# Patient Record
Sex: FEMALE | Race: WHITE | URBAN - METROPOLITAN AREA
[De-identification: names, ages, dates, MRNs, and addresses within clinical notes are randomized per-mention and may not be internally consistent; named-entity substitution may affect disease eponyms.]

---

## 2017-01-01 ENCOUNTER — HOME CARE/HOSPICE - HEALTHEAST (OUTPATIENT)
Dept: HOME HEALTH SERVICES | Facility: HOME HEALTH | Age: 77
End: 2017-01-01

## 2017-01-01 ENCOUNTER — MEDICAL CORRESPONDENCE (OUTPATIENT)
Dept: HEALTH INFORMATION MANAGEMENT | Facility: CLINIC | Age: 77
End: 2017-01-01

## 2017-01-01 ENCOUNTER — OFFICE VISIT - HEALTHEAST (OUTPATIENT)
Dept: GERIATRICS | Facility: CLINIC | Age: 77
End: 2017-01-01

## 2017-01-01 ENCOUNTER — TELEPHONE (OUTPATIENT)
Dept: FAMILY MEDICINE | Facility: CLINIC | Age: 77
End: 2017-01-01

## 2017-01-01 ENCOUNTER — SURGERY - HEALTHEAST (OUTPATIENT)
Dept: GASTROENTEROLOGY | Facility: HOSPITAL | Age: 77
End: 2017-01-01

## 2017-01-01 ENCOUNTER — AMBULATORY - HEALTHEAST (OUTPATIENT)
Dept: ADMINISTRATIVE | Facility: REHABILITATION | Age: 77
End: 2017-01-01

## 2017-01-01 ENCOUNTER — OFFICE VISIT (OUTPATIENT)
Dept: FAMILY MEDICINE | Facility: CLINIC | Age: 77
End: 2017-01-01

## 2017-01-01 VITALS
OXYGEN SATURATION: 97 % | DIASTOLIC BLOOD PRESSURE: 71 MMHG | HEART RATE: 68 BPM | RESPIRATION RATE: 16 BRPM | TEMPERATURE: 97.8 F | SYSTOLIC BLOOD PRESSURE: 125 MMHG

## 2017-01-01 DIAGNOSIS — K92.2 UPPER GI BLEEDING: ICD-10-CM

## 2017-01-01 DIAGNOSIS — R63.0 POOR APPETITE: ICD-10-CM

## 2017-01-01 DIAGNOSIS — I10 HYPERTENSION: ICD-10-CM

## 2017-01-01 DIAGNOSIS — L29.9 PRURITIC DISORDER: ICD-10-CM

## 2017-01-01 DIAGNOSIS — L03.115 BILATERAL LOWER LEG CELLULITIS: ICD-10-CM

## 2017-01-01 DIAGNOSIS — I48.91 ATRIAL FIBRILLATION WITH RVR (H): ICD-10-CM

## 2017-01-01 DIAGNOSIS — I48.91 ATRIAL FIBRILLATION (H): ICD-10-CM

## 2017-01-01 DIAGNOSIS — R60.0 PERIPHERAL EDEMA: ICD-10-CM

## 2017-01-01 DIAGNOSIS — L30.9 DERMATITIS: ICD-10-CM

## 2017-01-01 DIAGNOSIS — I48.0 PAROXYSMAL ATRIAL FIBRILLATION (H): ICD-10-CM

## 2017-01-01 DIAGNOSIS — L03.119 CELLULITIS OF LOWER EXTREMITY, UNSPECIFIED LATERALITY: ICD-10-CM

## 2017-01-01 DIAGNOSIS — M79.89 LEG SWELLING: ICD-10-CM

## 2017-01-01 DIAGNOSIS — I48.0 PAROXYSMAL ATRIAL FIBRILLATION (H): Primary | ICD-10-CM

## 2017-01-01 DIAGNOSIS — D64.9 ANEMIA: ICD-10-CM

## 2017-01-01 DIAGNOSIS — Z73.89: ICD-10-CM

## 2017-01-01 DIAGNOSIS — L89.321 DECUBITUS ULCER OF LEFT BUTTOCK, STAGE 1: ICD-10-CM

## 2017-01-01 DIAGNOSIS — L30.8 PRURITIC DERMATITIS: ICD-10-CM

## 2017-01-01 DIAGNOSIS — Z73.89 ACTIVITY MODERATELY LIMITED: ICD-10-CM

## 2017-01-01 DIAGNOSIS — L03.116 BILATERAL LOWER LEG CELLULITIS: ICD-10-CM

## 2017-01-01 DIAGNOSIS — M25.562 ACUTE PAIN OF LEFT KNEE: Primary | ICD-10-CM

## 2017-01-01 DIAGNOSIS — I87.8 VENOUS STASIS OF BOTH LOWER EXTREMITIES: Primary | ICD-10-CM

## 2017-01-01 DIAGNOSIS — Z13.1 SCREENING FOR DIABETES MELLITUS: ICD-10-CM

## 2017-01-01 LAB
BUN SERPL-MCNC: 19 MG/DL (ref 7–30)
CALCIUM SERPL-MCNC: 9.4 MG/DL (ref 8.5–10.4)
CHLORIDE SERPLBLD-SCNC: 101 MMOL/L (ref 94–109)
CHOLEST SERPL-MCNC: 194 MG/DL
CO2 SERPL-SCNC: 31 MMOL/L (ref 20–32)
CREAT SERPL-MCNC: 0.9 MG/DL (ref 0.6–1.3)
EGFR CALCULATED (BLACK REFERENCE): 77.9 ML/MIN
EGFR CALCULATED (NON BLACK REFERENCE): 64.4 ML/MIN
FASTING?: NORMAL
GLUCOSE SERPL-MCNC: 94 MG/DL (ref 60–109)
HBA1C MFR BLD: 5.5 % (ref 4.1–5.7)
HCT VFR BLD AUTO: 43.6 % (ref 35–47)
HDLC SERPL-MCNC: 54 MG/DL
HEMOGLOBIN: 13.1 G/DL (ref 11.7–15.7)
LDLC SERPL CALC-MCNC: 122 MG/DL
MCH RBC QN AUTO: 27.7 PG (ref 26.5–35)
MCHC RBC AUTO-ENTMCNC: 30 G/DL (ref 32–36)
MCV RBC AUTO: 92.1 FL (ref 78–100)
PLATELET # BLD AUTO: 161 K/UL (ref 150–450)
POTASSIUM SERPL-SCNC: 4.7 MMOL/L (ref 3.4–5.3)
RBC # BLD AUTO: 4.73 M/UL (ref 3.8–5.2)
SODIUM SERPL-SCNC: 143 MMOL/L (ref 133–144)
TRIGL SERPL-MCNC: 89 MG/DL
WBC # BLD AUTO: 8.9 K/UL (ref 4–11)

## 2017-01-01 RX ORDER — NAPROXEN 500 MG/1
500 TABLET ORAL 2 TIMES DAILY PRN
Qty: 30 TABLET | Refills: 1 | Status: SHIPPED | OUTPATIENT
Start: 2017-01-01

## 2017-01-01 RX ORDER — SILVER SULFADIAZINE 10 MG/G
CREAM TOPICAL DAILY
Qty: 100 G | Refills: 1 | Status: SHIPPED | OUTPATIENT
Start: 2017-01-01 | End: 2017-01-01

## 2017-01-01 RX ORDER — METOPROLOL TARTRATE 50 MG
50 TABLET ORAL 2 TIMES DAILY
Qty: 180 TABLET | Refills: 1 | Status: SHIPPED | OUTPATIENT
Start: 2017-01-01

## 2017-01-01 ASSESSMENT — MIFFLIN-ST. JEOR
SCORE: 1661.1
SCORE: 1625.72
SCORE: 1627.54
SCORE: 1639.33
SCORE: 1637.06

## 2017-08-14 NOTE — PATIENT INSTRUCTIONS
~~~~~~~~~~~~~~~~~~~  Your medication list is printed, please keep this with you, it is helpful to bring this current list to any other medical appointments, the emergency room or hospital.    If you had lab testing today and your results are reassuring or normal they will be be mailed to you within 7 days.     If the lab tests need quick action we will call you with the results.   The phone number we will call with results is # [phone] (home) . If this is not the best number please call our clinic and change the number.    If you need any refills please call your pharmacy and they will contact us.    If you have any further concerns or wish to schedule another appointment you must call our office during normal business hours  [phone] (8-5:00 M-F)  If you have urgent medical questions that cannot wait  you may also call [phone] at any time of day.  If you have a medical emergency please call 911.    Thank you for coming to [facility].      Referral for Physical Therapy faxed to [facility] and they will contact pt to schedule appointment.  HARSHA

## 2017-08-14 NOTE — PROGRESS NOTES
HPI:       Patricia Dugan is a 77 year old  female who presents for annual check.        Patient returns today for recheck and refill of meds.  Has been at home and seems to be doing stable.  Spends most of her time sitting on a sofa, and is unable to get up on her own. History of atrial fibrillation, but patient was taken off of anticoagulation due to problems with regulation, and poor patient understanding.  Patient takes aspirin instead.        Presents with her daughter, who is the cargiver. Daughter has had some trouble with drug abuse, but seems to be providing satisfactory supportive care per mother. Requires assistance standing, going to the bathroom, or moving off of the sofa.         Legs have become more edematous over the last year, per patient.  She reports that she elevates the legs on a wedge that she has for the sofa, but it is unclear how much time the legs are spent elevated.  No SOB. Mild erythema at the ankles.  No acute change. No longer able to wear the compression stockings due to edema.        Daughter states that patient sometimes get mild sores on her bottom, but is unsure if she has an ulcer at this time. Has put first aid cream on ulcer previously. I have explained that silver sulfacetamide might be better, and the patient would like a prescription for this. It has apparently help with these irritations in the past.      Wears depends because of inability to get to the bathroom on her own.                  PMHX:   Current Medications:   Current Outpatient Prescriptions   Medication Sig Dispense Refill     metoprolol (LOPRESSOR) 50 MG tablet Take 1 tablet (50 mg) by mouth 2 times daily 180 tablet 3     aspirin  MG tablet Take 1 tablet (325 mg) by mouth daily 100 tablet 3     order for DME Equipment being ordered: Juxalite velcro compression garment 2 each 1     Zinc Acetate, Oral, 50 MG CAPS Take 1 tablet by mouth daily (Patient not taking: Reported on 8/14/2017) 90 capsule  3       Existing Problems  Patient Active Problem List   Diagnosis     Paroxysmal atrial fibrillation (H)     Venous stasis of both lower extremities       Allergies:  No Known Allergies    Previous labs:  Lab Results   Component Value Date    HGB 14.0 08/08/2016    HCT 46.1 08/08/2016    .0 08/08/2016    BUN 21.0 08/08/2016    CO2 28.0 08/08/2016    INR 5.7 01/07/2016               Review of Systems:    CONSTITUTIONAL: patient is morbidly obese, no unexpected change in weight  SKIN: no worrisome rashes, no worrisome moles, no worrisome lesions  EYES: no acute vision problems or changes  ENT: no ear problems, no mouth problems, no throat problems  RESP: no significant cough, no shortness of breath  CV: no chest pain, no palpitations, no new or worsening peripheral edema  GI: no nausea, no vomiting, no constipation, no diarrhea          Physical Exam:     Vitals:    08/14/17 1406   BP: 125/71   Pulse: 68   Resp: 16   Temp: 97.8  F (36.6  C)   TempSrc: Oral   SpO2: 97%     There is no height or weight on file to calculate BMI.    GENERAL:alert, well hydrated, no distress, morbidly obese. In wheechair.   EYES: Eyes grossly normal to inspection, extraocular movements - intact, and PERRL  HENT: ear canals- normal; TMs- normal; Nose- normal; Mouth- no ulcers, no lesions  NECK: no tenderness, no adenopathy, no asymmetry, no masses, no stiffness;  RESP: lungs clear to auscultation - no rales, no rhonchi, no wheezes  CV: regular rates and rhythm, normal S1 S2, no S3 or S4 and no murmur, no click or rub -  Extremities: Was able to get patient to stand and examine buttox. No evidence of ulcers at this time.        Both legs are markedly edematous from the mid thigh down, with tense edema from the knees down.  Both feet are edematous. Mild erythematous area of vascular venous stasis bilateral from mid lower leg to ankle.              Labs and Procedures     Office Visit on 08/08/2016   Component Date Value Ref Range  Status     WBC 08/08/2016 9.3  4.0 - 11.0 K/uL Final     RBC 08/08/2016 5.27* 3.80 - 5.20 M/uL Final     Hemoglobin 08/08/2016 14.0  11.7 - 15.7 g/dL Final     Hematocrit 08/08/2016 46.1  35.0 - 47.0 % Final     MCV 08/08/2016 87.4  78.0 - 100.0 fL Final     MCH 08/08/2016 26.6  26.5 - 35.0 pg Final     MCHC 08/08/2016 30.4* 32.0 - 36.0 g/dL Final     Platelets 08/08/2016 212.0  150.0 - 450.0 K/uL Final     Glucose 08/08/2016 115.0* 60.0 - 109.0 mg/dL Final     Urea Nitrogen 08/08/2016 21.0  7.0 - 30.0 mg/dL Final     Creatinine 08/08/2016 0.8  0.6 - 1.3 mg/dL Final     Sodium 08/08/2016 135.0  133.0 - 144.0 mmol/L Final     Potassium 08/08/2016 4.2  3.4 - 5.3 mmol/L Final     Chloride 08/08/2016 103.0  94.0 - 109.0 mmol/L Final     Carbon Dioxide 08/08/2016 28.0  20.0 - 32.0 mmol/L Final     Calcium 08/08/2016 9.7  8.5 - 10.4 mg/dL Final     eGFR Calculated (Non Black Referen* 08/08/2016 73.9  >60.0 mL/min Final     eGFR Calculated (Black Reference) 08/08/2016 89.4  >60.0 mL/min Final     Hemoglobin A1C 08/08/2016 5.4  4.1 - 5.7 % Final              Assessment and Plan     1.Marked venous stasis with marked edema of the lower legs.  Unable to stand without assistance.  Will try to schedule for home physical therapy evaluation and treatment.   2. Paroxysmal atrial fib - will continue on aspirin therapy. Does not seem to be a candidate for anticoagulation in view of poor understanding and implementation and low health literacy.     3. Screen for diabetes  4. Morbid obesity.    5. Meds refilled.    6. Screen for hyperlipidemia.         Options for treatment and follow-up care were reviewed with the patient and/or guardian. Patricia A Letellier and/or guardian engaged in the decision making process and verbalized understanding of the options discussed and agreed with the final plan.    Lester Menjivar MD

## 2017-08-14 NOTE — PROGRESS NOTES
The pt wanted to know more about PCA services.  I checked the pt file, and she has Medicare.  Medicare does not pay or have any PCA services.  I talked to the pt about it, and see what is the pt income.  The pt stated that she gets about $1128 per month from [facility].  I checked the [facility] income guidelines for MA.  The pt should qualify for MA as her secondary insurance.  The pt will qualify for PCA services if she gets MA as a secondary insurance.  I helped the pt apply for MA for her  secondary insurance.  The pt will mail out the application to the [facility].

## 2017-08-14 NOTE — MR AVS SNAPSHOT
After Visit Summary   8/14/2017    Patricia Dugan    MRN: [id number]           Patient Information     Date Of Birth          1940        Visit Information        Provider Department      8/14/2017 2:00 PM Lester Menjivar MD [facility]        Today's Diagnoses     Paroxysmal atrial fibrillation (H)    -  1    Decubitus ulcer of left buttock, stage 1        Activity extremely limited          Care Instructions        ~~~~~~~~~~~~~~~~~~~  Your medication list is printed, please keep this with you, it is helpful to bring this current list to any other medical appointments, the emergency room or hospital.    If you had lab testing today and your results are reassuring or normal they will be be mailed to you within 7 days.     If the lab tests need quick action we will call you with the results.   The phone number we will call with results is # [phone] (home) . If this is not the best number please call our clinic and change the number.    If you need any refills please call your pharmacy and they will contact us.    If you have any further concerns or wish to schedule another appointment you must call our office during normal business hours  [phone] (8-5:00 M-F)  If you have urgent medical questions that cannot wait  you may also call [phone] at any time of day.  If you have a medical emergency please call 411.    Thank you for coming to [facility].            Follow-ups after your visit        Additional Services     PHYSICAL THERAPY REFERRAL       PT/OT REFERRAL  Patricia Dugan  1940  Phone #: [phone] (home)    needed: No  Language: English    PT/OT  Facility:   [facility], P: [phone], F: [phone]    History: Activity extremely limited.  Sitting in chair/sofa 24/7. Unable to stand alone. May need additional home support.     Precautions/Contraindications: None    Imaging Studies: None    Surgical  Procedure/Test Results: None    Treatment Goals:   Increase: Strength and ROM    Prognosis: fair    Visits: Up to 6    Evaluate: Evaluate and treat    Plan: Strength and flexibility                  Who to contact     Please call your clinic at [phone] to:    Ask questions about your health    Make or cancel appointments    Discuss your medicines    Learn about your test results    Speak to your doctor   If you have compliments or concerns about an experience at your clinic, or if you wish to file a complaint, please contact [facility] Patient Relations at [phone] or email us at [email or web address]         Additional Information About Your Visit        Click [employer] Information     Plibber is an electronic gateway that provides easy, online access to your medical records. With Plibber, you can request a clinic appointment, read your test results, renew a prescription or communicate with your care team.     To sign up for Plibber visit the website at [email or web address]   You will be asked to enter the access code listed below, as well as some personal information. Please follow the directions to create your username and password.     Your access code is: [email or web address]  Expires: 2017  5:27 PM     Your access code will  in 90 days. If you need help or a new code, please contact your [facility] or call [phone] for assistance.        Care EveryWhere ID     This is your Care EveryWhere ID. This could be used by other organizations to access your [facility] medical records  [id number]        Your Vitals Were     Pulse Temperature Respirations Pulse Oximetry          68 97.8  F (36.6  C) (Oral) 16 97%         Blood Pressure from Last 3 Encounters:   17 125/71   16 125/75   16 116/73    Weight from Last 3 Encounters:   16 240 lb 6.4 oz (109 kg)   16 248 lb 6.4 oz (112.7 kg)   16 256 lb 12.8  oz (116.5 kg)              We Performed the Following     Basic Metabolic Panel ([facility]) - Results < 1 hr     CBC with Plt ([facility])     Hemoglobin A1c ([facility])     Lipid Starr ([facility]) - Results > 1hr     PHYSICAL THERAPY REFERRAL          Today's Medication Changes          These changes are accurate as of: 8/14/17  5:27 PM.  If you have any questions, ask your nurse or doctor.               Start taking these medicines.        Dose/Directions    silver sulfADIAZINE 1 % cream   Commonly known as:  SILVADENE   Used for:  Decubitus ulcer of left buttock, stage 1   Started by:  Lester Menjivar MD        Apply topically daily for 7 days   Quantity:  100 g   Refills:  1            Where to get your medicines      These medications were sent to [address] [address]     Phone:  [phone]     silver sulfADIAZINE 1 % cream                Primary Care Provider Office Phone # Fax #    Mireya Staples [phone]       [facility] [address] [place] [address]        Equal Access to Services     [facility] AH: Hadii aad ku hadasho Soomaali, waaxda luqadaha, qaybta kaalmada adeegyada, janiya oneil . So [facility] [phone].    ATENCIÓN: Si habla español, tiene a mullen disposición servicios gratuitos de asistencia lingüística. Pipo al [phone].    We comply with applicable federal civil rights laws and [place] laws. We do not discriminate on the basis of race, color, national origin, age, disability sex, sexual orientation or gender identity.            Thank you!     Thank you for choosing [facility]  for your care. Our goal is always to provide you with excellent care. Hearing back from our patients is one way we can continue to improve our services. Please take a few minutes to complete the written survey that you may receive in the mail after your visit with us. Thank  you!             Your Updated Medication List - Protect others around you: Learn how to safely use, store and throw away your medicines at www.disposemymeds.org.          This list is accurate as of: 8/14/17  5:27 PM.  Always use your most recent med list.                   Brand Name Dispense Instructions for use Diagnosis    aspirin  MG EC tablet     100 tablet    Take 1 tablet (325 mg) by mouth daily    Paroxysmal atrial fibrillation (H), Screening for diabetes mellitus       metoprolol 50 MG tablet    LOPRESSOR    180 tablet    Take 1 tablet (50 mg) by mouth 2 times daily    Paroxysmal atrial fibrillation (H), Screening for diabetes mellitus       order for DME     2 each    Equipment being ordered: Juxalite velcro compression garment    Venous stasis of both lower extremities       silver sulfADIAZINE 1 % cream    SILVADENE    100 g    Apply topically daily for 7 days    Decubitus ulcer of left buttock, stage 1       Zinc Acetate (Oral) 50 MG Caps     90 capsule    Take 1 tablet by mouth daily    Zinc deficiency

## 2017-08-15 NOTE — TELEPHONE ENCOUNTER
[facility] phone call message- medication clarification/question:    Full Medication Name:    Strength:     Have you contacted your pharmacy about this refill request?     If  Yes,  which pharmacy?    When did you contact the pharmacy?    Additional comments/concerns from call to pharmacy:    Reason for call to clinic: Daughter had called earlier this morning to refill 2 Rx that was suppose to be refill on her appt yesterday but also had a question about pain medication. She states that they were told pain medication would be prescribe but not sure which one. Told her this morning that I would take a message and ask the doctor. Please call and advise.       Pharmacy confirmed as [facility] - [place], [place] - [address]: Yes    OK to leave a message on voice mail?     Primary language: English      needed? No    Call taken on August 15, 2017 at 3:41 PM by Ken Porter

## 2017-08-15 NOTE — TELEPHONE ENCOUNTER
Chart reviewed. Refill requests has been sent to PCP.  Not seeing any note for pain medication. Will route to Dr. Menjivar. Were you going to prescribe any pain medication?

## 2017-08-16 NOTE — TELEPHONE ENCOUNTER
Spoke with Dr. Menjivar via telephone. Confirmed with him on pain medication and stated he is not aware of the need for pain medication.  Stated he did not discuss pain nor pain medication at the visit.

## 2017-08-16 NOTE — TELEPHONE ENCOUNTER
MIGUEL ANGEL task to order home PT per Dr. Menjivar. Placed order. Further questions should be directed to Dr. Menjivar who evaluated patient and placed original order.     Radha Vuong, DO

## 2017-08-16 NOTE — TELEPHONE ENCOUNTER
Calling to check on pain medication, told her that still waiting for Dr. Menjivar's response on it. Please call and advise.

## 2017-08-16 NOTE — TELEPHONE ENCOUNTER
Called patient back and daughter picked up. Informed that Dr. Menjivar is aware of the need for pain medication. Daughter stated pain medication for patient hip and knee pain. Advised that it was not evaluated at clinic and best to make an appointment for further evaluation. Daughter upset and stated to go ask the nurse who roomed patient because this was discussed and hung up on me.

## 2017-08-16 NOTE — TELEPHONE ENCOUNTER
Called [facility] back and informed that what is wanted is home PT. [facility] will cancel outpatient PT order.     Called [facility] and informed the need for physical therapy referral for:   Treatment Goals:   Increase: Strength and ROM  Evaluate: Evaluate and treat  Plan: Strength and flexibility    Informed Dr. Menjivar will be following.     Faxed order to [facility] for PT referral.

## 2017-08-16 NOTE — TELEPHONE ENCOUNTER
Per chart review, this was for a home evaluation. Spoke with Dr. Menjivar via phone and confirmed with him home PT referral for eval and treat. Will route to [facility] this afternoon as Dr. Menjivar won't be in the rest of this week or next week to put in home PT referral order.

## 2017-08-16 NOTE — TELEPHONE ENCOUNTER
Called [facility] and clarified. Per referral order it is for outpatient as it is ordered for [facility]. If need it to be home PT will need to say [facility].

## 2017-08-16 NOTE — TELEPHONE ENCOUNTER
[facility] phone call message - order or referral request for patient:     Order or referral being requested: Needs Home PT      Additional Comments: She states that someone called and spoke to Patient about scheduling her for PT but they are requesting to do Home PT instead so wondering how they go about that? Please call and advise.     OK to leave a message on voice mail? No    Primary language: English      needed? No    Call taken on August 16, 2017 at 11:53 AM by Ken Porter

## 2017-08-17 NOTE — PROGRESS NOTES
Please call patient and send results letter  The blood tests show no diabetes, no anemia, and no kidney disease. The cholesterol is good, and does not need any medications.

## 2017-08-17 NOTE — TELEPHONE ENCOUNTER
Will route to PCP as Dr. Menjivar will not be here next week. Please review and give verbal okay to delay care until 8/23/17.

## 2017-08-17 NOTE — TELEPHONE ENCOUNTER
I do not recall discussing pain medication during the visit, and had not written a prescription. Neverheless, the patient is apparently having some left knee pain, and I would be happy to provide a prescription for naproxen sodium for relief of mild pain.  If the pain gets worse, or is not controlled with this, then she should be seen again.

## 2017-08-23 NOTE — TELEPHONE ENCOUNTER
Called and left message on identified VM of Justina. Left message: Verbal okay for PT order 3x a week for 3 weeks, OT eval,  eval and one PRN PT.    FYI PCP.

## 2017-08-23 NOTE — TELEPHONE ENCOUNTER
[facility] phone call message - order or referral request for patient:     Order or referral being requested: order      Additional Comments: started care today, would like to start PT 3x a week for 3 weeks. OT eval.,  eval., and one visit PRN.     OK to leave a message on voice mail? Yes    Primary language: English      needed? No    Call taken on August 23, 2017 at 12:07 PM by Anjelica Huff

## 2017-08-28 NOTE — TELEPHONE ENCOUNTER
Message left from Zabrina COFFEY with [facility] ([phone]) wondering if the correct paperwork for MA was filled out for patient. I see no documentation that we assisted patient. LM for EDIE.

## 2017-08-29 NOTE — TELEPHONE ENCOUNTER
Called [facility]. Given Melody verbal okay for OT order for ADL training and assessment for DME need, x1 a week for one week and x2 a week for two weeks. Melody will call back for specific DME order if needed.     Will route to FYI PCP.

## 2017-08-29 NOTE — TELEPHONE ENCOUNTER
[facility] phone call message - order or referral request for patient:     Order or referral being requested: order      Additional Comments: Requesting order for ADL training and DME for x1 a week for one week and then x2 a week for two weeks.    OK to leave a message on voice mail? Yes    Primary language: English      needed? No    Call taken on August 29, 2017 at 4:25 PM by Anjelica Huff

## 2017-09-07 NOTE — TELEPHONE ENCOUNTER
R/C from Zabrina. I found documentation from Sarah that he assisted patient to apply for secondary MA on 8/14/17. Informed Zabrina who is with patient

## 2017-09-14 NOTE — TELEPHONE ENCOUNTER
[facility] phone call message - order or referral request for patient:     Order or referral being requested: Verbal Orders for continuing OT for 2x a week for 2 weeks.     OK to leave a message on voice mail? Yes, can be detailed, and specify which Doctor is approving.     Primary language: English      needed? No    Call taken on September 14, 2017 at 10:18 AM by Nallely Ballesteros

## 2017-09-14 NOTE — TELEPHONE ENCOUNTER
Verbal approval for the requested order given to Lisa RUBY per clinic standing protocol. No further intervention needed at this time. Will route to Dr Zamarripa for review of the completed action. Audra RUBY

## 2017-09-14 NOTE — TELEPHONE ENCOUNTER
Verbal approval for the requested order given to Vilma RUBY [facility]. No further intervention needed at this time. Will route to inform Dr Zamarripa of the completed action. After review pls close encounter. Audra RUBY

## 2017-09-14 NOTE — TELEPHONE ENCOUNTER
[facility] phone call message - order or referral request for patient:     Order or referral being requested: Order      Additional Comments: Verbal order need for x2 a week for 3 weeks for medications, diet and edema.    OK to leave a message on voice mail? Yes    Primary language: English      needed? No    Call taken on September 14, 2017 at 1:38 PM by Anjelica Huff

## 2018-01-01 ENCOUNTER — OFFICE VISIT - HEALTHEAST (OUTPATIENT)
Dept: GERIATRICS | Facility: CLINIC | Age: 78
End: 2018-01-01

## 2018-01-01 ENCOUNTER — TELEPHONE (OUTPATIENT)
Dept: FAMILY MEDICINE | Facility: CLINIC | Age: 78
End: 2018-01-01

## 2018-01-01 ENCOUNTER — RECORDS - HEALTHEAST (OUTPATIENT)
Dept: LAB | Facility: CLINIC | Age: 78
End: 2018-01-01

## 2018-01-01 ENCOUNTER — AMBULATORY - HEALTHEAST (OUTPATIENT)
Dept: GERIATRICS | Facility: CLINIC | Age: 78
End: 2018-01-01

## 2018-01-01 ENCOUNTER — HOME CARE/HOSPICE - HEALTHEAST (OUTPATIENT)
Dept: HOME HEALTH SERVICES | Facility: HOME HEALTH | Age: 78
End: 2018-01-01

## 2018-01-01 DIAGNOSIS — L03.119 CELLULITIS OF LOWER EXTREMITY, UNSPECIFIED LATERALITY: ICD-10-CM

## 2018-01-01 DIAGNOSIS — I10 HYPERTENSION: ICD-10-CM

## 2018-01-01 DIAGNOSIS — I48.91 RAPID ATRIAL FIBRILLATION (H): ICD-10-CM

## 2018-01-01 DIAGNOSIS — M79.89 LEG SWELLING: ICD-10-CM

## 2018-01-01 DIAGNOSIS — I48.91 ATRIAL FIBRILLATION WITH RVR (H): ICD-10-CM

## 2018-01-01 DIAGNOSIS — D64.9 ANEMIA: ICD-10-CM

## 2018-01-01 DIAGNOSIS — E55.9 VITAMIN D DEFICIENCY: ICD-10-CM

## 2018-01-01 DIAGNOSIS — R53.81 PHYSICAL DEBILITY: ICD-10-CM

## 2018-01-01 DIAGNOSIS — R60.0 PERIPHERAL EDEMA: ICD-10-CM

## 2018-01-01 DIAGNOSIS — I87.333 CHRONIC VENOUS HYPERTENSION (IDIOPATHIC) WITH ULCER AND INFLAMMATION OF BILATERAL LOWER EXTREMITY (H): ICD-10-CM

## 2018-01-01 DIAGNOSIS — K92.2 UPPER GI BLEEDING: ICD-10-CM

## 2018-01-01 DIAGNOSIS — R05.9 COUGH: ICD-10-CM

## 2018-01-01 LAB — HGB BLD-MCNC: 9.7 G/DL (ref 12–16)

## 2018-02-28 NOTE — TELEPHONE ENCOUNTER
OLIVER Mei came back and said pt was having some pain, dtr is in background yelling your a liar and screaming at her mother    Went to take call but no one there, tried to call back, Souk will continue to try to reach her    Lbetz

## 2018-03-01 NOTE — TELEPHONE ENCOUNTER
I got a call transferred to me from Mei, one of the [facility].  She stated that the pt daughter was on the phone angry and yelling.  I talked to the pt daughter, she stated that she is overwhelmed and needs something to do about her mother.  She stated that she is living with her mother, and taking care of her.  Pt stated that her mother is 300lbs and is very difficult to maneuver, which makes it hard to take care of her.  I asked her what is it that we can help her with.  Pt daughter stated that she is overwhelmed and wanted to know a place she can place her mother for about a week or two.  I told the pt daughter that there is no place like that, we would have to place her in a assisted living facility, and that will take some time.  Pt daughter stated that she did not want to do that, since her mother is on a reverse mortgage program, and she will lose the house.  I told her that she only has two choices, one she can either admit her mother to an assisted living or be patience and try to do what she can.  Pt stated that her mother is acting like a child, and she wanted to know if its the medication that is making her like that.  I told her that I am not sure, but I will transfer to one of our triage nurse about it.  Pt daughter stated that she will call back later, and hung up the phone.

## 2021-05-31 VITALS — BODY MASS INDEX: 38.41 KG/M2 | WEIGHT: 238 LBS

## 2021-05-31 VITALS — BODY MASS INDEX: 38.33 KG/M2 | WEIGHT: 237.5 LBS

## 2021-05-31 VITALS — WEIGHT: 234.4 LBS | BODY MASS INDEX: 37.83 KG/M2

## 2021-05-31 VITALS — WEIGHT: 238.5 LBS | BODY MASS INDEX: 38.49 KG/M2

## 2021-05-31 VITALS — WEIGHT: 255.2 LBS | HEIGHT: 66 IN | BODY MASS INDEX: 41.01 KG/M2

## 2021-06-14 NOTE — PROGRESS NOTES
[facility]    Facility:   [facility] [[id number]]   Code Status: DNR      CHIEF COMPLAINT/REASON FOR VISIT:  Chief Complaint   Patient presents with     Follow Up     rash, rehab. ;lymphede       HISTORY:      HPI: Patricia is a 77 y.o. female who I had a chance to visit with secondary to her hospitalization December 3 - December 8, 2017 secondary to melena with upper GI bleed has a history of peripheral edema/lymphadenopathy also is treated for cellulitis lower extremity with Cipro.  The medication is now been discontinued as of December 17.  Over the weekend did develop a pruritic rash pretty much diffuse to her arms chest leg area.  They were putting some lotion on it.  We did talk about that at all think it is a true medication allergy and so therefore we will put on a prednisone taper 30 mg through 10 mg over each taper of 4 days also give her Benadryl as needed every 6 hours for the pruritus and then also keep me updated they would not improve.  Otherwise she does have chronic lymphedema there is no infection of the lower extremities.  She has been normotensive and afebrile.  Appetite is actually picking up pretty good.  No heartburn or reflux.  No loose stool.  Still is on a Danelle lift but still doing some basic exercises in therapy.    Past Medical History:   Diagnosis Date     Arthritis      Asthma      Atrial fibrillation with RVR      Morbid obesity      Peripheral edema              Family History   Problem Relation Age of Onset     Diabetes Mother      Heart disease Father      Cancer Father      lung      Heart failure Father      Diabetes Maternal Aunt      Social History     Social History     Marital status:      Spouse name: N/A     Number of children: N/A     Years of education: N/A     Social History Main Topics     Smoking status: Never Smoker     Smokeless tobacco: Never Used     Alcohol use Yes      Comment: occasionally     Drug use: No      Sexual activity: Not Currently     Other Topics Concern     Not on file     Social History Narrative    .  3 daughters.  -retired. Patient currently resides with her daughter.          Review of Systems  Currently she does deny any chills fever coughing wheezing chest pain dizziness vertigo nausea vomiting rashes or sores.  A history of lymphedema peripheral edema A. fib hypertension recent upper GI bleed    Current Outpatient Prescriptions:      metoprolol tartrate (LOPRESSOR) 50 MG tablet, Take 1 tablet (50 mg total) by mouth 2 (two) times a day., Disp: 60 tablet, Rfl: 3     omeprazole (PRILOSEC) 40 MG capsule, Take 1 capsule (40 mg total) by mouth 2 (two) times a day before meals., Disp: 120 capsule, Rfl: 0     senna-docusate (PERICOLACE) 8.6-50 mg tablet, Take 1 tablet by mouth 2 (two) times a day. Do not administer if loose stools., Disp: , Rfl: 0    .There were no vitals filed for this visit.  Blood pressure 118/65 pulse 84 respirations 18 temperature 97.9  Physical Exam  HENT:   Head: Normocephalic.   Eyes: Pupils are equal, round, and reactive to light.   Neck: Neck supple. No thyromegaly present.   Cardiovascular:     Chronic lymphedema.   Pulmonary/Chest: Breath sounds normal.   Abdominal: Bowel sounds are normal. There is no tenderness. There is no guarding.   Protuberant   Musculoskeletal:   Generalized debility and deconditioned   Lymphadenopathy:     She has no cervical adenopathy.   Neurological: She is alert.   Skin:   Diffuse pruritic dermatitis  LABS:   Lab Results   Component Value Date    WBC 7.9 12/11/2017    HGB 8.1 (L) 12/14/2017    HCT 26.6 (L) 12/11/2017    MCV 87 12/11/2017     12/11/2017     Results for orders placed or performed during the hospital encounter of 12/03/17   Basic Metabolic Panel   Result Value Ref Range    Sodium 136 136 - 145 mmol/L    Potassium 3.9 3.5 - 5.0 mmol/L    Chloride 104 98 - 107 mmol/L    CO2 26 22 - 31 mmol/L    Anion Gap,  Calculation 6 5 - 18 mmol/L    Glucose 92 70 - 125 mg/dL    Calcium 8.4 (L) 8.5 - 10.5 mg/dL    BUN 11 8 - 28 mg/dL    Creatinine 0.68 0.60 - 1.10 mg/dL    GFR MDRD Af Amer >60 >60 mL/min/1.73m2    GFR MDRD Non Af Amer >60 >60 mL/min/1.73m2           ASSESSMENT:      ICD-10-CM    1. Dermatitis L30.9    2. Pruritic dermatitis L29.9    3. Peripheral edema R60.9    4. Hypertension I10        PLAN:    She does have a hemoglobin we ordered up for later on this week on the 21st.  We will get her a prednisone taper and Benadryl.  Continue to manage and follow her chronic medical conditions.  I think she will make some pretty good progress overall but staff will keep me updated as any other problems or other concerns.        Electronically signed by: Michael Duane Johnson, CNP

## 2021-06-14 NOTE — PROGRESS NOTES
[facility]    Facility:   [facility] [[id number]]   Code Status: FULL CODE      CHIEF COMPLAINT/REASON FOR VISIT:  Chief Complaint   Patient presents with     Follow Up     rehab, gi, legs       HISTORY:      HPI: Patricia is a 77 y.o. female who I had a chance to visit with secondary to her hospitalization December 3 - December 8, 2017 secondary to melena with upper GI bleed and also did have increased lower extremity edema does have a history of peripheral edema and was diagnosed with cellulitis lower extremity.  The DVT was negative.  She did have a large duodenal ulcer.  Currently on omeprazole 40 mg twice daily.  She also has a poor appetite with chance to address that as well but in talking further she believes that she is getting enough nutrients on board.  She has been normotensive and afebrile.  She will be finishing up her Cipro and metronidazole December 17, 2017 secondary to the cellulitis.  In therapy she is using parallel bars otherwise is currently a Danelle lift.  We also a chance to talk about the purpose and role of therapy as well as but she needs to accomplish her independence prior to discharge.    Past Medical History:   Diagnosis Date     Arthritis      Asthma      Atrial fibrillation with RVR      Morbid obesity      Peripheral edema              Family History   Problem Relation Age of Onset     Diabetes Mother      Heart disease Father      Cancer Father      lung      Heart failure Father      Diabetes Maternal Aunt      Social History     Social History     Marital status:      Spouse name: N/A     Number of children: N/A     Years of education: N/A     Social History Main Topics     Smoking status: Never Smoker     Smokeless tobacco: Never Used     Alcohol use Yes      Comment: occasionally     Drug use: No     Sexual activity: Not Currently     Other Topics Concern     Not on file     Social History Narrative    .  3 daughters.   -retired. Patient currently resides with her daughter.          Review of Systems  Currently she does deny any chills fever coughing wheezing chest pain dizziness vertigo nausea vomiting rashes or sores.  A history of lymphedema peripheral edema A. fib hypertension recent upper GI bleed    Current Outpatient Prescriptions   Medication Sig     ciprofloxacin HCl (CIPRO) 500 MG tablet Take 1 tablet (500 mg total) by mouth 2 times a day at 6:00 am and 4:00 pm for 9 days.     metoprolol tartrate (LOPRESSOR) 50 MG tablet Take 1 tablet (50 mg total) by mouth 2 (two) times a day.     metroNIDAZOLE (FLAGYL) 500 MG tablet Take 1 tablet (500 mg total) by mouth 3 (three) times a day for 9 days.     omeprazole (PRILOSEC) 40 MG capsule Take 1 capsule (40 mg total) by mouth 2 (two) times a day before meals.     senna-docusate (PERICOLACE) 8.6-50 mg tablet Take 1 tablet by mouth 2 (two) times a day. Do not administer if loose stools.       .There were no vitals filed for this visit.  Blood pressure 102/58 pulse 82 respirations 16 temperature 98.0  Physical Exam   HENT:   Head: Normocephalic.   Eyes: Pupils are equal, round, and reactive to light.   Neck: Neck supple. No thyromegaly present.   Cardiovascular:   A. fib.  Chronic lymphedema.   Pulmonary/Chest: Breath sounds normal.   Abdominal: Bowel sounds are normal. There is no tenderness. There is no guarding.   Protuberant   Musculoskeletal:   Generalized debility and deconditioned   Lymphadenopathy:     She has no cervical adenopathy.   Neurological: She is alert.   Skin: Skin is warm and dry. No rash noted.         LABS:   Lab Results   Component Value Date    WBC 7.9 12/11/2017    HGB 8.1 (L) 12/14/2017    HCT 26.6 (L) 12/11/2017    MCV 87 12/11/2017     12/11/2017     Results for orders placed or performed during the hospital encounter of 12/03/17   Basic Metabolic Panel   Result Value Ref Range    Sodium 136 136 - 145 mmol/L    Potassium 3.9 3.5 - 5.0  mmol/L    Chloride 104 98 - 107 mmol/L    CO2 26 22 - 31 mmol/L    Anion Gap, Calculation 6 5 - 18 mmol/L    Glucose 92 70 - 125 mg/dL    Calcium 8.4 (L) 8.5 - 10.5 mg/dL    BUN 11 8 - 28 mg/dL    Creatinine 0.68 0.60 - 1.10 mg/dL    GFR MDRD Af Amer >60 >60 mL/min/1.73m2    GFR MDRD Non Af Amer >60 >60 mL/min/1.73m2       No results found for: HGBA1C      ASSESSMENT:      ICD-10-CM    1. Upper GI bleeding K92.2    2. Peripheral edema R60.9    3. Hypertension I10    4. Poor appetite R63.0        PLAN:    Her laboratory studies overall look pretty good.  Did talk about her nutritional status as well as extra house nutrient supplements.  Continue with current medications and treatments and also once again the Cipro and metronidazole are completed on December 17.  They will continue with wound care and therapy.  Rechecking her hemoglobin on December 21, 2017    .    Electronically signed by: Michael Duane Johnson, CNP

## 2021-06-14 NOTE — PROGRESS NOTES
[facility]      Facility:    [facility] [[id number]]  Code Status: UNKNOWN       Chief Complaint/Reason for Visit:  Chief Complaint   Patient presents with     H & P       HPI:   Patricia is a 77 y.o. female with history of chronic lower extremity lymphedema, proximal A. fib came into ED for worsening bilateral leg swelling.  Found to have elevated WBC.  Also negative for DVT.  Seen by ID and change antibiotics.  Got a wound culture.  Seen by Dr. Bush.  Discharged to TCU today.  She needs to be follow with primary in 3-5 days.      Past Medical History:  Past Medical History:   Diagnosis Date     Arthritis      Asthma      Atrial fibrillation with RVR      Morbid obesity      Peripheral edema            Surgical History:  Past Surgical History:   Procedure Laterality Date     ESOPHAGOGASTRODUODENOSCOPY N/A 12/4/2017    Procedure: ESOPHAGOGASTRODUODENOSCOPY (EGD) with biopsy;  Surgeon: Lester Grant MD;  Location: [facility];  Service:      HERNIA REPAIR  1950    umbilical     JOINT REPLACEMENT Right     hip     TUBAL LIGATION  1950       Family History:   Family History   Problem Relation Age of Onset     Diabetes Mother      Heart disease Father      Cancer Father      lung      Heart failure Father      Diabetes Maternal Aunt        Social History:    Social History     Social History     Marital status:      Spouse name: N/A     Number of children: N/A     Years of education: N/A     Social History Main Topics     Smoking status: Never Smoker     Smokeless tobacco: Never Used     Alcohol use Yes      Comment: occasionally     Drug use: No     Sexual activity: Not Currently     Other Topics Concern     Not on file     Social History Narrative    .  3 daughters.  -retired. Patient currently resides with her daughter.         Review of Systems:  Pertinent items are noted in HPI.    Physical Exam:   General: Patient is alert.   Vitals:  Reviewed.  HEENT: Head is NCAT. Eyes show no injection or icterus. Nares negative. Oropharynx well hydrated.  Neck: Supple. No tenderness or adenopathy. No JVD.  Lungs: Clear bilaterally. No wheezes.  Cardiovascular: Regular rate and rhythm, normal S1. S2.  Back: No spinal or CVA tenderness.  Abdomen: Soft, no tenderness on exam. Bowel sounds present. No guarding rebound or rigidity.  : Deferred.  Extremities: Mod edema is noted.  Musculoskeletal: Age related degen changes.   Psych: Mood appears good.      Assessment/Plan:  1. LE swelling.Lymphedema.  2. LE cellulitis. Finish abx.  3. GIB. Secondary to ulcer. On PPI. Aspirin discontinued.   4. Afib. Not on aspirin at this time secondary to GIB.  5, Deconditioning.  6. Anemia. S/p transfusion. Check hgb.      Total time greater than 60 minutes, greater than 50% counseling and coordination of care, time spent in interview and examination of patient, review of records, discussion with nursing staff.          Electronically signed by: Zabrina Dutton MD

## 2021-06-14 NOTE — PROGRESS NOTES
[facility]    Facility:   [facility] [[id number]]   Code Status: FULL CODE      CHIEF COMPLAINT/REASON FOR VISIT:  Chief Complaint   Patient presents with     Follow Up     rehab, labs, anemia       HISTORY:      HPI: Patricia is a 77 y.o. female who I had a chance to visit with secondary to her hospitalization December 3 - December 8, 2017 secondary to melena with upper GI bleed.  Also has a history of lymphedema and was treated for cellulitis secondary to the lymphedema and was placed on Cipro.  Earlier in the week she was treated for chronic pruritus with prednisone which has been quite helpful she is almost 100% better even after couple of days.  She has been normotensive she is on omeprazole for GI bleed hemoglobin came back today at 7.5 it was 8.1 in December 21.  Will place her on ferrous sulfate and vitamin C twice a day rechecking hemoglobin next week on the 22nd.  She has not noticed any bleeding.  She has been normotensive and afebrile.  Is participating in therapy.  No changes with her lymphedema.    Past Medical History:   Diagnosis Date     Arthritis      Asthma      Atrial fibrillation with RVR      Morbid obesity      Peripheral edema              Family History   Problem Relation Age of Onset     Diabetes Mother      Heart disease Father      Cancer Father      lung      Heart failure Father      Diabetes Maternal Aunt      Social History     Social History     Marital status:      Spouse name: N/A     Number of children: N/A     Years of education: N/A     Social History Main Topics     Smoking status: Never Smoker     Smokeless tobacco: Never Used     Alcohol use Yes      Comment: occasionally     Drug use: No     Sexual activity: Not Currently     Other Topics Concern     Not on file     Social History Narrative    .  3 daughters.  -retired. Patient currently resides with her daughter.          Review of Systems  Currently  she does deny any chills fever coughing wheezing chest pain dizziness vertigo nausea vomiting rashes or sores.  A history of lymphedema peripheral edema A. fib hypertension recent upper GI bleed    Current Outpatient Prescriptions   Medication Sig     ascorbic acid, vitamin C, (ASCORBIC ACID WITH ALMAS HIPS) 500 MG tablet Take 500 mg by mouth 2 (two) times a day.     ferrous sulfate 325 (65 FE) MG tablet Take 1 tablet by mouth 2 (two) times a day.     metoprolol tartrate (LOPRESSOR) 50 MG tablet Take 1 tablet (50 mg total) by mouth 2 (two) times a day.     omeprazole (PRILOSEC) 40 MG capsule Take 1 capsule (40 mg total) by mouth 2 (two) times a day before meals.     senna-docusate (PERICOLACE) 8.6-50 mg tablet Take 1 tablet by mouth 2 (two) times a day. Do not administer if loose stools.       .There were no vitals filed for this visit.  Blood pressure is 103/67 pulse 79 respirations 18 temperature 98.5  Physical Exam    HENT:   Head: Normocephalic.   Eyes: Pupils are equal, round, and reactive to light.   Neck: Neck supple. No thyromegaly present.   Cardiovascular:     Chronic lymphedema.   Pulmonary/Chest: Breath sounds normal.   Abdominal: Bowel sounds are normal. There is no tenderness. There is no guarding.   Protuberant   Musculoskeletal:   Generalized debility and deconditioned   Lymphadenopathy:     She has no cervical adenopathy.   Neurological: She is alert.   LABS:   Lab Results   Component Value Date    WBC 7.9 12/11/2017    HGB 7.5 (L) 12/21/2017    HCT 26.6 (L) 12/11/2017    MCV 87 12/11/2017     12/11/2017         ASSESSMENT:      ICD-10-CM    1. Pruritic disorder L29.9    2. Anemia D64.9    3. Peripheral edema R60.9    4. Upper GI bleeding K92.2        PLAN:    Rechecking the hemoglobin next week place her on ferrous sulfate and vitamin C twice daily.  Continue with the omeprazole.  Continue with the prednisone the pruritus has significantly improved even after a short couple of days on  prednisone.  Try to encourage her appetite and her therapy.    .    Electronically signed by: Michael Duane Johnson, CNP

## 2021-06-15 NOTE — PROGRESS NOTES
[facility]    Facility:   [facility] [[id number]]   Code Status: DNR  PCP: Mariam Mcqueen MD   Phone: [phone]   Fax: [phone]      CHIEF COMPLAINT/REASON FOR VISIT:  Chief Complaint   Patient presents with     Discharge Summary       HISTORY COURSE:  Patricia is a 77 y.o. female undergoing therapy at  [facility]. She was hospitalized 12/3/2017-12/08/2017 with leg swelling and upper GI bleed.. She was treated with antibiotics for lower leg cellulitis and omeprazole for findings of a large duodenal ulcer.   Her past  medical history includes  chronic lower extremity lymphedema, atrial fibrillation, asthma, arthritis, vit D deficiency, anemia, and hypertension. Today she was seen in her room. She denies  CP. Her breathing is more relaxed and her lungs were clear   It appears she needs a ramp built to return home. She tells me it was started last night and should be done today.  She is mostly  wheelchair bound.  Her weight is down since admission.. Last weight is 243.4.5lbs  and she was 253.5 on 12/11/17. Overall she appears stable. She had no concerns except intermittent left hip pain which is relieved with medication.  SHe is scheduled to discharge home tomorrow to home  with her daughter and Nursing , PT/OT, HHA and SW for home care services.       Review of Systems  Constitutional: Positive for fatigue. Negative for appetite change, chills and fever.   HENT:  Negative for congestion and sore throat.    Respiratory: Positive for cough. Negative for shortness of breath and wheezing.    Cardiovascular: Positive for leg swelling. Negative for chest pain.   Gastrointestinal: Negative for abdominal distention, abdominal pain, anal bleeding, diarrhea and nausea.   Genitourinary: Negative for dysuria.   Musculoskeletal: Positive for arthralgias. Negative for myalgias.        Chronic left hip pain. - She was seen by Orthopedics in the past and tells me  she refused surgery   Skin: Negative for color change, rash and wound.   Neurological: Negative for dizziness and weakness.   Psychiatric/Behavioral: Negative for agitation, behavioral problems, confusion and sleep disturbance.   Vitals:    01/22/18 1205   BP: 123/70   Pulse: 63   Resp: 18   Temp: 97.8  F (36.6  C)   SpO2: 94%   Weight: (!) 234 lb 6.4 oz (106.3 kg)       Physical Exam  Constitutional: She is oriented to person, place, and time. She appears well-developed and well-nourished.   Pleasant woman in no acute distress.    HENT:   Head: Normocephalic.   Eyes: Conjunctivae are normal.   Neck: Normal range of motion.   Cardiovascular: Normal rate, regular rhythm and normal heart sounds.    No murmur heard.  Irregularly irregular - rate controlled.    Pulmonary/Chest: No respiratory distress. She has no rales.   No wheezing noted this visit   Abdominal: Soft. Bowel sounds are normal. She exhibits no distension. There is no tenderness.   Musculoskeletal: Normal range of motion.   Chronic lymphedema- legs wrapped daily.  Recently treated with antibiotics.    Neurological: She is alert and oriented to person, place, and time.   Skin: Skin is warm.   Psychiatric: She has a normal mood and affect. Her behavior is normal.   MEDICATION LIST:  Current Outpatient Prescriptions   Medication Sig     acetaminophen (TYLENOL) 325 MG tablet Take by mouth 3 (three) times a day as needed for pain.     ascorbic acid, vitamin C, (ASCORBIC ACID WITH ALMAS HIPS) 500 MG tablet Take 500 mg by mouth 2 (two) times a day.     camphor-menthol (SARNA) lotion Apply 1 application topically as needed for itching. Apply to back topically every  8 hours  - may keep at bedside.     [START ON 4/5/2018] cholecalciferol, vitamin D3, 1,000 unit tablet Take 2,000 Units by mouth daily.     dextromethorphan-guaiFENesin (ROBITUSSIN-DM)  mg/5 mL liquid Take 5 mL by mouth every 4 (four) hours as needed.     ergocalciferol (ERGOCALCIFEROL) 50,000  unit capsule Take 50,000 Units by mouth once a week. Every Thursday for 12 weeks.     ferrous sulfate 325 (65 FE) MG tablet Take 1 tablet by mouth 2 (two) times a day.     ipratropium-albuterol (DUO-NEB) 0.5-2.5 mg/3 mL nebulizer 3 mL every 4 (four) hours as needed.     magnesium oxide 500 mg Tab Take 500 mg by mouth 2 (two) times a day.     metoprolol tartrate (LOPRESSOR) 50 MG tablet Take 1 tablet (50 mg total) by mouth 2 (two) times a day.     omeprazole (PRILOSEC) 40 MG capsule Take 1 capsule (40 mg total) by mouth 2 (two) times a day before meals.     oseltamivir (TAMIFLU) 75 MG capsule Take 75 mg by mouth daily. To end 1/30/2018     senna-docusate (PERICOLACE) 8.6-50 mg tablet Take 1 tablet by mouth daily.     UNABLE TO FIND Med Name:Abreva: Apply to lip 5x daily for cold sore       DISCHARGE DIAGNOSIS:    ICD-10-CM    1. Hypertension I10    2. Cellulitis of lower extremity, unspecified laterality L03.119    3. Vitamin D deficiency  E55.9    4. Rapid atrial fibrillation I48.91        MEDICAL EQUIPMENT NEEDS:  Home wheelchair and walker    DISCHARGE PLAN/FACE TO FACE:  I certify that services are/were furnished while this patient was under the care of a physician and that a physician or an allowed non-physician practitioner (NPP), had a face-to-face encounter that meets the physician face-to-face encounter requirements. The encounter was in whole, or in part, related to the primary reason for home health. The patient is confined to his/her home and needs intermittent skilled nursing, physical therapy, speech-language pathology, or the continued need for occupational therapy. A plan of care has been established by a physician and is periodically reviewed by a physician.  Date of Face-to-Face Encounter: 1/22/18    I certify that, based on my findings, the following services are medically necessary home health services: Nursing, OT/PT, HHA, SW    My clinical findings support the need for the above skilled services  because: nursing for medication management, OT/PT for continued strength and endurance, HHA to assist with bathing and SW for community resources    This patient is homebound because: unable to stand for long intervals due to compromised cirvculation  The patient is, or has been, under my care and I have initiated the establishment of the plan of care. This patient will be followed by a physician who will periodically review the plan of care.      Electronically signed by: Frances Whelan CNP     Electronically signed by: Zabrina Dutton MD

## 2021-06-15 NOTE — PROGRESS NOTES
[facility]    Facility:   [facility] [[id number]]   Code Status: DNR      CHIEF COMPLAINT/REASON FOR VISIT:  Chief Complaint   Patient presents with     Follow Up     rehab, hgb       HISTORY:      HPI: Patricia is a 77 y.o. female who I had the pleasure to revisit with secondary to her hospitalization December 3 - December 8, 2017 secondary to melena with upper GI bleed.    She currently is still in the rehabilitation or transitional care unit because she still needs around at home to get her into the house so I guess they are waiting for the ramp to be built in the meantime though she is still doing some therapy she almost feels that she is pretty much where she needs to be out although still needs assistance with all ADLs including aware left.  She does have lymphedema along no longer any lower extremity cellulitis antibiotics were completed on December.  Did develop a little cough over the weekend we will give her some duo nebs does have a history of asthma we will see how that goes.  Hemoglobin did pop up 8.4 last week it was 7.5 we will go ahead and check that in the near future.  She also about 2 weeks ago did have generalized pruritus which did resolve with a prednisone taper.  Normotensive and afebrile.    Past Medical History:   Diagnosis Date     Arthritis      Asthma      Atrial fibrillation with RVR      Morbid obesity      Peripheral edema              Family History   Problem Relation Age of Onset     Diabetes Mother      Heart disease Father      Cancer Father      lung      Heart failure Father      Diabetes Maternal Aunt      Social History     Social History     Marital status:      Spouse name: N/A     Number of children: N/A     Years of education: N/A     Social History Main Topics     Smoking status: Never Smoker     Smokeless tobacco: Never Used     Alcohol use Yes      Comment: occasionally     Drug use: No     Sexual activity: Not  Currently     Other Topics Concern     Not on file     Social History Narrative    .  3 daughters.  -retired. Patient currently resides with her daughter.          Review of Systems  Currently she does deny any chills fever uwheezing chest pain dizziness vertigo nausea vomiting rashes or sores.  A history of lymphedema peripheral edema A. fib hypertension recent upper GI bleed       Current Outpatient Prescriptions   Medication Sig     ascorbic acid, vitamin C, (ASCORBIC ACID WITH ALMAS HIPS) 500 MG tablet Take 500 mg by mouth 2 (two) times a day.     ferrous sulfate 325 (65 FE) MG tablet Take 1 tablet by mouth 2 (two) times a day.     metoprolol tartrate (LOPRESSOR) 50 MG tablet Take 1 tablet (50 mg total) by mouth 2 (two) times a day.     omeprazole (PRILOSEC) 40 MG capsule Take 1 capsule (40 mg total) by mouth 2 (two) times a day before meals.     senna-docusate (PERICOLACE) 8.6-50 mg tablet Take 1 tablet by mouth 2 (two) times a day. Do not administer if loose stools.       .There were no vitals filed for this visit.  Blood pressure 141/72 pulse 96 respirations 18  Physical Exam     HENT:   Head: Normocephalic.   Eyes: Pupils are equal, round, and reactive to light.   Neck: Neck supple. No thyromegaly present.   Cardiovascular:     Chronic lymphedema.   Pulmonary/Chest: Breath sounds normal.   Abdominal: . There is no tenderness. There is no guarding.   Protuberant   Musculoskeletal:   Generalized debility and deconditioned   Lymphadenopathy:     She has no cervical adenopathy.   Neurological: She is alert.       LABS:   Lab Results   Component Value Date    WBC 7.9 12/11/2017    HGB 8.4 (L) 12/26/2017    HCT 26.6 (L) 12/11/2017    MCV 87 12/11/2017     12/11/2017     Results for orders placed or performed during the hospital encounter of 12/03/17   Basic Metabolic Panel   Result Value Ref Range    Sodium 136 136 - 145 mmol/L    Potassium 3.9 3.5 - 5.0 mmol/L    Chloride 104 98 - 107  mmol/L    CO2 26 22 - 31 mmol/L    Anion Gap, Calculation 6 5 - 18 mmol/L    Glucose 92 70 - 125 mg/dL    Calcium 8.4 (L) 8.5 - 10.5 mg/dL    BUN 11 8 - 28 mg/dL    Creatinine 0.68 0.60 - 1.10 mg/dL    GFR MDRD Af Amer >60 >60 mL/min/1.73m2    GFR MDRD Non Af Amer >60 >60 mL/min/1.73m2           ASSESSMENT:      ICD-10-CM    1. Cough R05    2. Peripheral edema R60.9    3. Hypertension I10    4. Physical debility R53.81        PLAN:    Adding duo nebs 3 times a day just for the cough and a history of asthma.  She does have a hemoglobin already ordered for January 4 she is on ferrous sulfate and vitamin C.  She does need assistance with all ADLs.  Also continue to monitor her cough and encourage parents of the spirometry.  Her legs have resolved from the cellulitis and she still needs assistance with all ADLs and apparently they are waiting on her to build a ramp to get into her home.    .    Electronically signed by: Michael Duane Johnson, CNP

## 2021-06-15 NOTE — PROGRESS NOTES
[facility]    Facility:   [facility] [[id number]]   Code Status: DNR      CHIEF COMPLAINT/REASON FOR VISIT:  Chief Complaint   Patient presents with     Review Of Multiple Medical Conditions     upper GI bleed, edema, Hypertension       HISTORY:      HPI: Patricia is a 77 y.o. female undergoing therapy at  [facility]. She was hospitalized 12/3/2017-12/08/2017 with leg swelling and upper GI bleed.. She was treated with antibiotics for lower leg cellulitis and omeprazole for findings of a large duodenal ulcer.   Her past  medical history includes  chronic lower extremity lymphedema, atrial fibrillation, asthma, arthritis, vit D deficiency, anemia, and Hypertension. She was seen in her room. She denied SOB or CP. She does have a slight cough and using Robitussin PRN.  Her HGB has increased to 9.7. It appears she needs a ramp built to return home. In talking with pt it seems she has had much difficulty ambulating prior to her current TCU stay. She talked about not being able to get up 3 stairs at home. She tells me it took 2 hours and the assist of 2 men and 2 woman to get her just up the 3 steps into her porch. She tells me she used to walk backwards down the steps until the railing broke. She is wheelchair bound and it is unclear to me if she has done any  recent ambulation. Her weight appears to be significantly down. Last weight is 238lbs and she was 253.5 on 12/11/17. Overall she appears stable.    Past Medical History:   Diagnosis Date     Arthritis      Asthma      Atrial fibrillation with RVR      Morbid obesity      Peripheral edema              Family History   Problem Relation Age of Onset     Diabetes Mother      Heart disease Father      Cancer Father      lung      Heart failure Father      Diabetes Maternal Aunt      Social History     Social History     Marital status:      Spouse name: N/A     Number of children: N/A     Years of  education: N/A     Social History Main Topics     Smoking status: Never Smoker     Smokeless tobacco: Never Used     Alcohol use Yes      Comment: occasionally     Drug use: No     Sexual activity: Not Currently     Other Topics Concern     Not on file     Social History Narrative    .  3 daughters.  -retired. Patient currently resides with her daughter.          Review of Systems   Constitutional: Positive for fatigue. Negative for appetite change, chills and fever.   HENT: Positive for postnasal drip. Negative for congestion and sore throat.    Respiratory: Positive for cough. Negative for shortness of breath and wheezing.    Cardiovascular: Positive for leg swelling. Negative for chest pain.   Gastrointestinal: Positive for constipation. Negative for abdominal distention, abdominal pain, anal bleeding, diarrhea and nausea.   Genitourinary: Negative for dysuria.   Musculoskeletal: Positive for arthralgias. Negative for myalgias.   Skin: Negative for color change, rash and wound.   Neurological: Negative for dizziness and weakness.   Psychiatric/Behavioral: Negative for agitation, behavioral problems, confusion and sleep disturbance.       .  Vitals:    01/09/18 1111   BP: 111/55   Pulse: 86   Resp: 18   Temp: 97.9  F (36.6  C)   SpO2: 92%   Weight: (!) 238 lb (108 kg)       Physical Exam   Constitutional: She is oriented to person, place, and time. She appears well-developed and well-nourished.   Pleasant woman in no acute distress.    HENT:   Head: Normocephalic.   Eyes: Conjunctivae are normal.   Neck: Normal range of motion.   Cardiovascular: Normal rate, regular rhythm and normal heart sounds.    No murmur heard.  Irregularly irregular - rate controlled.    Pulmonary/Chest: Breath sounds normal. No respiratory distress. She has no wheezes. She has no rales.   Abdominal: Soft. Bowel sounds are normal. She exhibits no distension. There is no tenderness.   Musculoskeletal: Normal range of  motion. She exhibits edema.   Chronic lymphedema- legs wrapped.    Neurological: She is alert and oriented to person, place, and time.   Skin: Skin is warm.   Psychiatric: She has a normal mood and affect. Her behavior is normal.         LABS:   Recent Results (from the past 240 hour(s))   Hemoglobin   Result Value Ref Range    Hemoglobin 9.7 (L) 12.0 - 16.0 g/dL     Current Outpatient Prescriptions   Medication Sig     acetaminophen (TYLENOL) 325 MG tablet Take by mouth 3 (three) times a day as needed for pain.     ascorbic acid, vitamin C, (ASCORBIC ACID WITH ALMAS HIPS) 500 MG tablet Take 500 mg by mouth 2 (two) times a day.     dextromethorphan-guaiFENesin (ROBITUSSIN-DM)  mg/5 mL liquid Take 5 mL by mouth every 4 (four) hours as needed.     ferrous sulfate 325 (65 FE) MG tablet Take 1 tablet by mouth 2 (two) times a day.     ipratropium-albuterol (DUO-NEB) 0.5-2.5 mg/3 mL nebulizer 3 mL every 4 (four) hours as needed.     magnesium oxide 500 mg Tab Take 500 mg by mouth 2 (two) times a day.     metoprolol tartrate (LOPRESSOR) 50 MG tablet Take 1 tablet (50 mg total) by mouth 2 (two) times a day.     omeprazole (PRILOSEC) 40 MG capsule Take 1 capsule (40 mg total) by mouth 2 (two) times a day before meals.     senna-docusate (PERICOLACE) 8.6-50 mg tablet Take 1 tablet by mouth daily.     UNABLE TO FIND Med Name:Abreva: Apply to lip 5x daily for cold sore     ASSESSMENT:      ICD-10-CM    1. Anemia D64.9    2. Peripheral edema R60.9    3. Upper GI bleeding K92.2    4. Hypertension I10    5. Leg swelling M79.89    6. Atrial fibrillation with RVR I48.91        PLAN:    Anemia-continue vit C and ferrous sulfate. Last HGB 9.7  Edema- chronic lymphedema- legs ace wrapped  Upper GI bleed- resolved has a duodenal ulcer continue Prilosec 40 mg BID   Hypertension- stable on metoprolol  Atrial fibrillation rate controlled continue metoprolol- not on anticoagulants- recent GI bleed.   Vitamin D deficiency last Vit D  level 13.1, start 50,000 IU weekly x12 weeks and then 2000IU daily, Check a Vit D level in 12 weeks.   Constipation start senna s 1 tab daily.     Electronically signed by: Frances Whelan CNP

## 2021-06-15 NOTE — PROGRESS NOTES
[facility]    Facility:   [facility] [[id number]]   Code Status: DNR      CHIEF COMPLAINT/REASON FOR VISIT:  Chief Complaint   Patient presents with     Follow Up     rehab, weak       HISTORY:      HPI: Patricia is a 77 y.o. female who I had a chance to revisit with today secondary to her hospitalization December 3 - December 8, 2017 secondary to melena with an upper GI bleed.  She currently is involved in the therapy process.  Still needs a lot of assistance and work with her strengthening and balance as well as her overall care.  Last week she did have pruritus prednisone taper was significantly successful and no longer an issue.  She does have anemia hemoglobin was 7.5 now it is 8.4 she is on ferrous sulfate and vitamin C.  She has been normotensive and afebrile.  Weights are stable at 249 pounds.  Not having any significant discomfort.  No lower extremity cellulitis does have chronic lower extremity lymphedema.    Past Medical History:   Diagnosis Date     Arthritis      Asthma      Atrial fibrillation with RVR      Morbid obesity      Peripheral edema              Family History   Problem Relation Age of Onset     Diabetes Mother      Heart disease Father      Cancer Father      lung      Heart failure Father      Diabetes Maternal Aunt      Social History     Social History     Marital status:      Spouse name: N/A     Number of children: N/A     Years of education: N/A     Social History Main Topics     Smoking status: Never Smoker     Smokeless tobacco: Never Used     Alcohol use Yes      Comment: occasionally     Drug use: No     Sexual activity: Not Currently     Other Topics Concern     Not on file     Social History Narrative    .  3 daughters.  -retired. Patient currently resides with her daughter.          Review of Systems  Currently she does deny any chills fever coughing wheezing chest pain dizziness vertigo nausea vomiting  rashes or sores.  A history of lymphedema peripheral edema A. fib hypertension recent upper GI bleed    Current Outpatient Prescriptions   Medication Sig     ascorbic acid, vitamin C, (ASCORBIC ACID WITH ALMAS HIPS) 500 MG tablet Take 500 mg by mouth 2 (two) times a day.     ferrous sulfate 325 (65 FE) MG tablet Take 1 tablet by mouth 2 (two) times a day.     metoprolol tartrate (LOPRESSOR) 50 MG tablet Take 1 tablet (50 mg total) by mouth 2 (two) times a day.     omeprazole (PRILOSEC) 40 MG capsule Take 1 capsule (40 mg total) by mouth 2 (two) times a day before meals.     senna-docusate (PERICOLACE) 8.6-50 mg tablet Take 1 tablet by mouth 2 (two) times a day. Do not administer if loose stools.       .There were no vitals filed for this visit.  Blood pressure 127/74 pulse 78 respirations 20 temperature 98.9  Physical Exam    HENT:   Head: Normocephalic.   Eyes: Pupils are equal, round, and reactive to light.   Neck: Neck supple. No thyromegaly present.   Cardiovascular:     Chronic lymphedema.   Pulmonary/Chest: Breath sounds normal.   Abdominal: . There is no tenderness. There is no guarding.   Protuberant   Musculoskeletal:   Generalized debility and deconditioned   Lymphadenopathy:     She has no cervical adenopathy.   Neurological: She is alert.      LABS:   Lab Results   Component Value Date    WBC 7.9 12/11/2017    HGB 8.4 (L) 12/26/2017    HCT 26.6 (L) 12/11/2017    MCV 87 12/11/2017     12/11/2017     Results for orders placed or performed during the hospital encounter of 12/03/17   Basic Metabolic Panel   Result Value Ref Range    Sodium 136 136 - 145 mmol/L    Potassium 3.9 3.5 - 5.0 mmol/L    Chloride 104 98 - 107 mmol/L    CO2 26 22 - 31 mmol/L    Anion Gap, Calculation 6 5 - 18 mmol/L    Glucose 92 70 - 125 mg/dL    Calcium 8.4 (L) 8.5 - 10.5 mg/dL    BUN 11 8 - 28 mg/dL    Creatinine 0.68 0.60 - 1.10 mg/dL    GFR MDRD Af Amer >60 >60 mL/min/1.73m2    GFR MDRD Non Af Amer >60 >60 mL/min/1.73m2            ASSESSMENT:      ICD-10-CM    1. Upper GI bleeding K92.2    2. Atrial fibrillation with RVR I48.91    3. Hypertension I10    4. Peripheral edema R60.9        PLAN:    Had a chance to talk about the rehabilitation process and she really does want to be able to go home soon.  Hemoglobin did increase 8.4 was 7.5 continue with the ferrous sulfate and vitamin C combination.  Otherwise continue to monitor her multiple chronic medical conditions and her generalized debility as well as her rehabilitation process.        Electronically signed by: Michael Duane Johnson, CNP

## 2021-06-15 NOTE — PROGRESS NOTES
[facility]    Facility:   Formerly named [facility] [[id number]]   Code Status: DNR      CHIEF COMPLAINT/REASON FOR VISIT:  Chief Complaint   Patient presents with     Follow Up     rehab,       HISTORY:      HPI: Patricia is a 77 y.o. female had a chance to revisit with secondary to her hospitalization December 3 - December 8, 2017 secondary to melena with upper GI bleed.  She did have a hemoglobin 7.5 it is already up to over 9 she is on ferrous sulfate twice daily vitamin C.  She does have a history of A. fib she has been normotensive and afebrile.  Appetite is fair.  She is participating with the rehabilitation services.  She is little angry today because she wants to go home and I again I tried to explain to her about having meetings with  and staff and I also asked her if she felt ready to go home and if she has achieved therapy recommendations and at this point she is not sure so proba she does receive assistance with all ADLs.  [place] a good idea to talk with them first see how she is doing and to see what she needs to do to be able to accomplish herself at home and at this point she just thinks that her daughter will completely take care of her once she gets home but also trying to avoid rehospitalizations.  At any rate she did develop a little cough nothing too major she is getting some duo nebs along with the cough syrup.  She does receive assistance with all ADLs    Past Medical History:   Diagnosis Date     Arthritis      Asthma      Atrial fibrillation with RVR      Morbid obesity      Peripheral edema              Family History   Problem Relation Age of Onset     Diabetes Mother      Heart disease Father      Cancer Father      lung      Heart failure Father      Diabetes Maternal Aunt      Social History     Social History     Marital status:      Spouse name: N/A     Number of children: N/A     Years of education: N/A     Social History Main Topics      Smoking status: Never Smoker     Smokeless tobacco: Never Used     Alcohol use Yes      Comment: occasionally     Drug use: No     Sexual activity: Not Currently     Other Topics Concern     Not on file     Social History Narrative    .  3 daughters.  -retired. Patient currently resides with her daughter.          Review of Systems  Currently she does deny any chills fever uwheezing chest pain dizziness vertigo nausea vomiting rashes or sores.  A history of lymphedema peripheral edema A. fib hypertension recent upper GI bleed      Current Outpatient Prescriptions:      ascorbic acid, vitamin C, (ASCORBIC ACID WITH ALMAS HIPS) 500 MG tablet, Take 500 mg by mouth 2 (two) times a day., Disp: , Rfl:      ferrous sulfate 325 (65 FE) MG tablet, Take 1 tablet by mouth 2 (two) times a day., Disp: , Rfl:      metoprolol tartrate (LOPRESSOR) 50 MG tablet, Take 1 tablet (50 mg total) by mouth 2 (two) times a day., Disp: 60 tablet, Rfl: 3     omeprazole (PRILOSEC) 40 MG capsule, Take 1 capsule (40 mg total) by mouth 2 (two) times a day before meals., Disp: 120 capsule, Rfl: 0     senna-docusate (PERICOLACE) 8.6-50 mg tablet, Take 1 tablet by mouth 2 (two) times a day. Do not administer if loose stools., Disp: , Rfl: 0    .There were no vitals filed for this visit.  Blood pressure 127/77 pulse 90 respirations 20 temperature 98.1  Physical Exam      HENT:   Head: Normocephalic.   Eyes: Pupils are equal, round, and reactive to light.   Neck: Neck supple. No thyromegaly present.   Cardiovascular: irreg.    Chronic lymphedema.   Pulmonary/Chest: Breath sounds normal.   Abdominal: . There is no tenderness. There is no guarding.   Protuberant   Musculoskeletal:   Generalized debility and deconditioned   Lymphadenopathy:     She has no cervical adenopathy.   Neurological: She is alert.     LABS:   Lab Results   Component Value Date    WBC 7.9 12/11/2017    HGB 9.7 (L) 01/04/2018    HCT 26.6 (L) 12/11/2017    MCV  87 12/11/2017     12/11/2017     Results for orders placed or performed during the hospital encounter of 12/03/17   Basic Metabolic Panel   Result Value Ref Range    Sodium 136 136 - 145 mmol/L    Potassium 3.9 3.5 - 5.0 mmol/L    Chloride 104 98 - 107 mmol/L    CO2 26 22 - 31 mmol/L    Anion Gap, Calculation 6 5 - 18 mmol/L    Glucose 92 70 - 125 mg/dL    Calcium 8.4 (L) 8.5 - 10.5 mg/dL    BUN 11 8 - 28 mg/dL    Creatinine 0.68 0.60 - 1.10 mg/dL    GFR MDRD Af Amer >60 >60 mL/min/1.73m2    GFR MDRD Non Af Amer >60 >60 mL/min/1.73m2           ASSESSMENT:      ICD-10-CM    1. Peripheral edema R60.9    2. Upper GI bleeding K92.2    3. Anemia D64.9    4. Chronic venous hypertension (idiopathic) with ulcer and inflammation of bilateral lower extremity I87.333        PLAN:    Her lymphedema has decreased there is no cellulitis her weight is down to 37 pounds about a week ago she weighed 249 pounds I think this is purposeful weight loss.  She is okay about the weight loss as well.  We will continue to manage and follow her chronic medical conditions including her blood pressures and her therapy recommendations and I did ask her to also check with therapy and  and she is also the [place].  Hemoglobin now at 9.7.        Electronically signed by: Michael Duane Johnson, CNP

## 2021-06-15 NOTE — PROGRESS NOTES
[facility]    Facility:   [facility] [[id number]]   Code Status: DNR      CHIEF COMPLAINT/REASON FOR VISIT:  Chief Complaint   Patient presents with     Review Of Multiple Medical Conditions       HISTORY:      HPI: Patricia is a 77 y.o. female [facility]. She was hospitalized 12/3/2017-12/08/2017 with leg swelling and upper GI bleed.. She was treated with antibiotics for lower leg cellulitis and omeprazole for findings of a large duodenal ulcer.   Her past  medical history includes  chronic lower extremity lymphedema, atrial fibrillation, asthma, arthritis, vit D deficiency, anemia, and hypertension. Today she was seen in her room. She CP. SHe tells me she has been wheezing but refuses her nebs.because they make her cough up phlegm . She was  noted to have expiratory wheezes throughout. I had a discussion with her on how the nebs work to open the airways and it is a good that she is able to cough up the phlegm. I explained to her that leaving it  in her lungs puts her at high risk of developing pneumonia. She has agreed to use the nebs after our talk and reported now she understands. I have asked the nurses to give her one now.     Her HGB has increased to 9.7. It appears she needs a ramp built to return home. In talking with pt it seems she has had much difficulty ambulating prior to her current TCU stay.  She is wheelchair bound.  Her weight appears to be significantly down. Last weight is 238lbs and she was 253.5 on 12/11/17. Overall she appears stable. I spoke with  and she is scheduled for a care conference on 1/15/18    Past Medical History:   Diagnosis Date     Arthritis      Asthma      Atrial fibrillation with RVR      Morbid obesity      Peripheral edema              Family History   Problem Relation Age of Onset     Diabetes Mother      Heart disease Father      Cancer Father      lung      Heart failure  Father      Diabetes Maternal Aunt      Social History     Social History     Marital status:      Spouse name: N/A     Number of children: N/A     Years of education: N/A     Social History Main Topics     Smoking status: Never Smoker     Smokeless tobacco: Never Used     Alcohol use Yes      Comment: occasionally     Drug use: No     Sexual activity: Not Currently     Other Topics Concern     Not on file     Social History Narrative    .  3 daughters.  -retired. Patient currently resides with her daughter.          Review of Systems   Constitutional: Positive for fatigue. Negative for appetite change, chills and fever.   HENT: Positive for postnasal drip. Negative for congestion and sore throat.    Respiratory: Positive for cough. Negative for shortness of breath and wheezing.    Cardiovascular: Positive for leg swelling. Negative for chest pain.   Gastrointestinal: Positive for constipation. Negative for abdominal distention, abdominal pain, anal bleeding, diarrhea and nausea.   Genitourinary: Negative for dysuria.   Musculoskeletal: Positive for arthralgias. Negative for myalgias.        Chronic left hip pain. - She was seen by Orthopedics in the past and tells me she refused surgery   Skin: Negative for color change, rash and wound.   Neurological: Negative for dizziness and weakness.   Psychiatric/Behavioral: Negative for agitation, behavioral problems, confusion and sleep disturbance.       .  Vitals:    01/12/18 0854   BP: 139/79   Pulse: 71   Resp: 18   Temp: 98.3  F (36.8  C)   SpO2: 98%   Weight: (!) 238 lb 8 oz (108.2 kg)       Physical Exam   Constitutional: She is oriented to person, place, and time. She appears well-developed and well-nourished.   Pleasant woman in no acute distress.    HENT:   Head: Normocephalic.   Eyes: Conjunctivae are normal.   Neck: Normal range of motion.   Cardiovascular: Normal rate, regular rhythm and normal heart sounds.    No murmur  heard.  Irregularly irregular - rate controlled.    Pulmonary/Chest: No respiratory distress. She has wheezes. She has no rales.   Expiratory wheeze   Abdominal: Soft. Bowel sounds are normal. She exhibits no distension. There is no tenderness.   Musculoskeletal: Normal range of motion.   Chronic lymphedema- legs wrapped daily. Aces were off this morning .  Lower extremity reddens and venous changes. No drainage. Recently treated with antibiotics.    Neurological: She is alert and oriented to person, place, and time.   Skin: Skin is warm.   Psychiatric: She has a normal mood and affect. Her behavior is normal.         LABS:   Recent Results (from the past 240 hour(s))   Hemoglobin   Result Value Ref Range    Hemoglobin 9.7 (L) 12.0 - 16.0 g/dL     Current Outpatient Prescriptions   Medication Sig     acetaminophen (TYLENOL) 325 MG tablet Take by mouth 3 (three) times a day as needed for pain.     ascorbic acid, vitamin C, (ASCORBIC ACID WITH ALMAS HIPS) 500 MG tablet Take 500 mg by mouth 2 (two) times a day.     [START ON 4/5/2018] cholecalciferol, vitamin D3, 1,000 unit tablet Take 2,000 Units by mouth daily.     dextromethorphan-guaiFENesin (ROBITUSSIN-DM)  mg/5 mL liquid Take 5 mL by mouth every 4 (four) hours as needed.     ergocalciferol (ERGOCALCIFEROL) 50,000 unit capsule Take 50,000 Units by mouth once a week. Every Thursday for 12 weeks.     ferrous sulfate 325 (65 FE) MG tablet Take 1 tablet by mouth 2 (two) times a day.     ipratropium-albuterol (DUO-NEB) 0.5-2.5 mg/3 mL nebulizer 3 mL every 4 (four) hours as needed.     magnesium oxide 500 mg Tab Take 500 mg by mouth 2 (two) times a day.     metoprolol tartrate (LOPRESSOR) 50 MG tablet Take 1 tablet (50 mg total) by mouth 2 (two) times a day.     omeprazole (PRILOSEC) 40 MG capsule Take 1 capsule (40 mg total) by mouth 2 (two) times a day before meals.     senna-docusate (PERICOLACE) 8.6-50 mg tablet Take 1 tablet by mouth daily.     UNABLE TO FIND  Med Name:Abreva: Apply to lip 5x daily for cold sore     ASSESSMENT:      ICD-10-CM    1. Anemia D64.9    2. Cellulitis of lower extremity, unspecified laterality L03.119    3. Hypertension I10    4. Vitamin D deficiency  E55.9    5. Atrial fibrillation with RVR I48.91        PLAN:    Anemia-continue vit C and ferrous sulfate. Last HGB 9.7  Edema- chronic lymphedema- legs ace wrapped daily  Upper GI bleed- resolved has a duodenal ulcer continue Prilosec 40 mg BID   Hypertension- stable on metoprolol  Atrial fibrillation rate controlled continue metoprolol- not on anticoagulants- recent GI bleed.   Vitamin D deficiency last Vit D level 13.1, on  50,000 IU weekly x12 weeks and then 2000IU daily, Check a Vit D level in 12 weeks.   Constipation  senna s 1 tab daily.  Denies today.   wheezing PRN Ellie    Electronically signed by: Frances Whelan, CNP

## 2021-06-15 NOTE — PROGRESS NOTES
[facility]    Facility:   [facility] [[id number]]   Code Status: DNR      CHIEF COMPLAINT/REASON FOR VISIT:  Chief Complaint   Patient presents with     Follow Up     rehab       HISTORY:      HPI: Patricia is a 77 y.o. female who I had a chance to revisit with today secondary to her hospitalization December 3 - December 8, 2017 secondary to melena with upper GI bleed.  Also has a history of lymphedema and was treated with antibiotics for cellulitis.  She also last week did end up with chronic pruritus not sure what the etiology is put on a prednisone taper it has significantly helped and improved her overall condition.  She does have anemia hemoglobin 7.5 she does have a hemoglobin ordered for today the results not yet back by this dictation she was put on ferrous sulfate twice a day along with vitamin C.  She has been normotensive and afebrile.  Still has generalized weakness and debility.  She wants to be discharged by the end of the week.  Is on omeprazole 40 mg twice daily.  Appetite only fair.  Lymphedema.  No infection.    Past Medical History:   Diagnosis Date     Arthritis      Asthma      Atrial fibrillation with RVR      Morbid obesity      Peripheral edema              Family History   Problem Relation Age of Onset     Diabetes Mother      Heart disease Father      Cancer Father      lung      Heart failure Father      Diabetes Maternal Aunt      Social History     Social History     Marital status:      Spouse name: N/A     Number of children: N/A     Years of education: N/A     Social History Main Topics     Smoking status: Never Smoker     Smokeless tobacco: Never Used     Alcohol use Yes      Comment: occasionally     Drug use: No     Sexual activity: Not Currently     Other Topics Concern     Not on file     Social History Narrative    .  3 daughters.  -retired. Patient currently resides with her daughter.          Review of  Systems  Currently she does deny any chills fever coughing wheezing chest pain dizziness vertigo nausea vomiting rashes or sores.  A history of lymphedema peripheral edema A. fib hypertension recent upper GI bleed      Current Outpatient Prescriptions:      ascorbic acid, vitamin C, (ASCORBIC ACID WITH ALMAS HIPS) 500 MG tablet, Take 500 mg by mouth 2 (two) times a day., Disp: , Rfl:      ferrous sulfate 325 (65 FE) MG tablet, Take 1 tablet by mouth 2 (two) times a day., Disp: , Rfl:      metoprolol tartrate (LOPRESSOR) 50 MG tablet, Take 1 tablet (50 mg total) by mouth 2 (two) times a day., Disp: 60 tablet, Rfl: 3     omeprazole (PRILOSEC) 40 MG capsule, Take 1 capsule (40 mg total) by mouth 2 (two) times a day before meals., Disp: 120 capsule, Rfl: 0     senna-docusate (PERICOLACE) 8.6-50 mg tablet, Take 1 tablet by mouth 2 (two) times a day. Do not administer if loose stools., Disp: , Rfl: 0    .There were no vitals filed for this visit.  Blood pressure 130/68 pulse 62 respirations 18 temperature 98.1  Physical Exam    HENT:   Head: Normocephalic.   Eyes: Pupils are equal, round, and reactive to light.   Neck: Neck supple. No thyromegaly present.   Cardiovascular:     Chronic lymphedema.   Pulmonary/Chest: Breath sounds normal.   Abdominal: Bowel sounds are normal. There is no tenderness. There is no guarding.   Protuberant   Musculoskeletal:   Generalized debility and deconditioned   Lymphadenopathy:     She has no cervical adenopathy.   Neurological: She is alert.     LABS:   Lab Results   Component Value Date    WBC 7.9 12/11/2017    HGB 7.5 (L) 12/21/2017    HCT 26.6 (L) 12/11/2017    MCV 87 12/11/2017     12/11/2017     Results for orders placed or performed during the hospital encounter of 12/03/17   Basic Metabolic Panel   Result Value Ref Range    Sodium 136 136 - 145 mmol/L    Potassium 3.9 3.5 - 5.0 mmol/L    Chloride 104 98 - 107 mmol/L    CO2 26 22 - 31 mmol/L    Anion Gap, Calculation 6 5 - 18  mmol/L    Glucose 92 70 - 125 mg/dL    Calcium 8.4 (L) 8.5 - 10.5 mg/dL    BUN 11 8 - 28 mg/dL    Creatinine 0.68 0.60 - 1.10 mg/dL    GFR MDRD Af Amer >60 >60 mL/min/1.73m2    GFR MDRD Non Af Amer >60 >60 mL/min/1.73m2           ASSESSMENT:      ICD-10-CM    1. Hypertension I10    2. Bilateral lower leg cellulitis L03.116     L03.115    3. Peripheral edema R60.9    4. Pruritic disorder L29.9        PLAN:    She does have a hemoglobin ordered for today the results not yet back by this dictation.  She wants to be able to be discharged by the end of the week to home.  I do not know how that is working out with the  as well as the therapy department.  Hopefully the hemoglobin is improving nicely.  She did not have any other further questions and will continue to work with therapy department.        Electronically signed by: Michael Duane Johnson, CNP

## 2021-06-15 NOTE — PROGRESS NOTES
[facility]    Facility:   [facility] [[id number]]   Code Status: DNR      CHIEF COMPLAINT/REASON FOR VISIT:  Chief Complaint   Patient presents with     Review Of Multiple Medical Conditions       HISTORY:      HPI: Patricia is a 77 y.o. female [facility]. She was hospitalized 12/3/2017-12/08/2017 with leg swelling and upper GI bleed.. She was treated with antibiotics for lower leg cellulitis and omeprazole for findings of a large duodenal ulcer.   Her past  medical history includes  chronic lower extremity lymphedema, atrial fibrillation, asthma, arthritis, vit D deficiency, anemia, and hypertension. Today she was seen in her room. She denies  CP. Her breathing is more relaxed and she had no wheezing today.    Her HGB has increased to 9.7. It appears she needs a ramp built to return home. In talking with pt it seems she has had much difficulty ambulating prior to her current TCU stay.  She is wheelchair bound.  Her weight appears to be significantly down. Last weight is 237.5lbs.  and she was 253.5 on 12/11/17. Overall she appears stable. SHe is scheduled for a care conference today.     Past Medical History:   Diagnosis Date     Arthritis      Asthma      Atrial fibrillation with RVR      Morbid obesity      Peripheral edema              Family History   Problem Relation Age of Onset     Diabetes Mother      Heart disease Father      Cancer Father      lung      Heart failure Father      Diabetes Maternal Aunt      Social History     Social History     Marital status:      Spouse name: N/A     Number of children: N/A     Years of education: N/A     Social History Main Topics     Smoking status: Never Smoker     Smokeless tobacco: Never Used     Alcohol use Yes      Comment: occasionally     Drug use: No     Sexual activity: Not Currently     Other Topics Concern     Not on file     Social History Narrative    .  3  daughters.  -retired. Patient currently resides with her daughter.          Review of Systems   Constitutional: Positive for fatigue. Negative for appetite change, chills and fever.   HENT: Positive for postnasal drip. Negative for congestion and sore throat.    Respiratory: Positive for cough. Negative for shortness of breath and wheezing.    Cardiovascular: Positive for leg swelling. Negative for chest pain.   Gastrointestinal: Negative for abdominal distention, abdominal pain, anal bleeding, diarrhea and nausea.   Genitourinary: Negative for dysuria.   Musculoskeletal: Positive for arthralgias. Negative for myalgias.        Chronic left hip pain. - She was seen by Orthopedics in the past and tells me she refused surgery   Skin: Negative for color change, rash and wound.   Neurological: Negative for dizziness and weakness.   Psychiatric/Behavioral: Negative for agitation, behavioral problems, confusion and sleep disturbance.       .  Vitals:    01/15/18 0808   BP: 127/58   Pulse: 71   Resp: 18   Temp: 97.5  F (36.4  C)   SpO2: 95%   Weight: (!) 237 lb 8 oz (107.7 kg)       Physical Exam   Constitutional: She is oriented to person, place, and time. She appears well-developed and well-nourished.   Pleasant woman in no acute distress.    HENT:   Head: Normocephalic.   Eyes: Conjunctivae are normal.   Neck: Normal range of motion.   Cardiovascular: Normal rate, regular rhythm and normal heart sounds.    No murmur heard.  Irregularly irregular - rate controlled.    Pulmonary/Chest: No respiratory distress. She has no rales.   No wheezing noted this visit   Abdominal: Soft. Bowel sounds are normal. She exhibits no distension. There is no tenderness.   Musculoskeletal: Normal range of motion.   Chronic lymphedema- legs wrapped daily. Aces were on this morning . Recently treated with antibiotics.    Neurological: She is alert and oriented to person, place, and time.   Skin: Skin is warm.   Psychiatric: She  has a normal mood and affect. Her behavior is normal.         LABS:     Current Outpatient Prescriptions   Medication Sig     acetaminophen (TYLENOL) 325 MG tablet Take by mouth 3 (three) times a day as needed for pain.     ascorbic acid, vitamin C, (ASCORBIC ACID WITH ALMAS HIPS) 500 MG tablet Take 500 mg by mouth 2 (two) times a day.     [START ON 4/5/2018] cholecalciferol, vitamin D3, 1,000 unit tablet Take 2,000 Units by mouth daily.     dextromethorphan-guaiFENesin (ROBITUSSIN-DM)  mg/5 mL liquid Take 5 mL by mouth every 4 (four) hours as needed.     ergocalciferol (ERGOCALCIFEROL) 50,000 unit capsule Take 50,000 Units by mouth once a week. Every Thursday for 12 weeks.     ferrous sulfate 325 (65 FE) MG tablet Take 1 tablet by mouth 2 (two) times a day.     ipratropium-albuterol (DUO-NEB) 0.5-2.5 mg/3 mL nebulizer 3 mL every 4 (four) hours as needed.     magnesium oxide 500 mg Tab Take 500 mg by mouth 2 (two) times a day.     metoprolol tartrate (LOPRESSOR) 50 MG tablet Take 1 tablet (50 mg total) by mouth 2 (two) times a day.     omeprazole (PRILOSEC) 40 MG capsule Take 1 capsule (40 mg total) by mouth 2 (two) times a day before meals.     senna-docusate (PERICOLACE) 8.6-50 mg tablet Take 1 tablet by mouth daily.     UNABLE TO FIND Med Name:Abreva: Apply to lip 5x daily for cold sore     ASSESSMENT:      ICD-10-CM    1. Leg swelling M79.89    2. Atrial fibrillation with RVR I48.91    3. 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 Vitamin D deficiency  E55.9    5. Hypertension I10        PLAN:    Anemia-continue vit C and ferrous sulfate. Last HGB 9.7  Edema- chronic lymphedema- legs ace wrapped daily  Upper GI bleed- resolved has a duodenal ulcer continue Prilosec 40 mg BID   Hypertension- stable on metoprolol  Atrial fibrillation rate controlled continue metoprolol- not on anticoagulants- recent GI bleed.   Vitamin D deficiency last Vit  D level 13.1, on  50,000 IU weekly x12 weeks and then 2000IU daily, Check a Vit D level in 12 weeks.   Family care conference today  Constipation  senna s 1 tab daily.  Denies today.   wheezing PRN Ellie    Electronically signed by: Frances Whelan CNP